# Patient Record
Sex: FEMALE | Race: WHITE
[De-identification: names, ages, dates, MRNs, and addresses within clinical notes are randomized per-mention and may not be internally consistent; named-entity substitution may affect disease eponyms.]

---

## 2020-05-02 ENCOUNTER — HOSPITAL ENCOUNTER (EMERGENCY)
Dept: HOSPITAL 46 - ED | Age: 70
Discharge: HOME | End: 2020-05-02
Payer: MEDICARE

## 2020-05-02 VITALS — HEIGHT: 69 IN | WEIGHT: 175 LBS | BODY MASS INDEX: 25.92 KG/M2

## 2020-05-02 DIAGNOSIS — J32.3: ICD-10-CM

## 2020-05-02 DIAGNOSIS — Z88.0: ICD-10-CM

## 2020-05-02 DIAGNOSIS — H53.9: Primary | ICD-10-CM

## 2020-05-02 DIAGNOSIS — I10: ICD-10-CM

## 2020-05-02 DIAGNOSIS — Z79.899: ICD-10-CM

## 2020-06-23 ENCOUNTER — HOSPITAL ENCOUNTER (OUTPATIENT)
Dept: HOSPITAL 46 - DS | Age: 70
Discharge: HOME | End: 2020-06-23
Attending: OTOLARYNGOLOGY
Payer: MEDICARE

## 2020-06-23 VITALS — WEIGHT: 170 LBS | BODY MASS INDEX: 25.18 KG/M2 | HEIGHT: 69 IN

## 2020-06-23 DIAGNOSIS — J32.3: ICD-10-CM

## 2020-06-23 DIAGNOSIS — E03.9: ICD-10-CM

## 2020-06-23 DIAGNOSIS — Z87.891: ICD-10-CM

## 2020-06-23 DIAGNOSIS — J33.8: Primary | ICD-10-CM

## 2020-06-23 DIAGNOSIS — Z79.899: ICD-10-CM

## 2020-06-23 DIAGNOSIS — F32.9: ICD-10-CM

## 2020-06-23 PROCEDURE — 09BX0ZZ EXCISION OF LEFT SPHENOID SINUS, OPEN APPROACH: ICD-10-PCS | Performed by: OTOLARYNGOLOGY

## 2020-06-23 NOTE — NUR
06/23/20 1101 Anamika Altamirano 1043 PT ARRIVED IN PACU NON RESPONSIVE TO NOXIOUS STIMULI WITH OPA
IN PLACE. 1053 PT REACTIVE. OPA REMOVED. 1100 DENTURES RETURNED TO PT.
AWAKE TALKING TO STAFF.

## 2020-06-23 NOTE — NUR
PATIENT TOLERATED WATER AND PUDDING.
VS CHECKED. DISCHARGE INSTRUCTIONS GIVEN TO PATIENT. IV DC'D WNL. TIP INTACT.
DRESSING APPLIED. ASSISTED PATIENT OOB AND TO WALK AROUND ROOM. GAIT STEADY.
PATIENT GETTING DRESSED.

## 2020-06-23 NOTE — OR
[facility]
                                    [address], [place]  [address]
_________________________________________________________________________________________
                                                                 Signed   
 
 
DATE OF OPERATION:
06/23/2020
 
SURGEON:
Adam Gonzalez MD
 
PREOPERATIVE DIAGNOSIS:
Chronic left sphenoid sinusitis.
 
POSTOPERATIVE DIAGNOSIS:
Chronic left sphenoid sinusitis.
 
PROCEDURE:
Left sphenoid sinusotomy.
 
ANESTHESIA:
General LMA.
 
CRNA:
Kwesi.
 
PREOPERATIVE HISTORY:
Ms. Puri is a 69-year-old lady, who has opacified left sphenoid sinus.  She was worked
up for visual problems.  CAT scan revealed this as an incidental finding.  She has some
calcifications in this sinus and she is taken to the operating for the above-mentioned
procedures. 
 
OPERATIVE PROCEDURE AND FINDINGS:
After informed consent, the patient was taken to the operating room, placed in supine
position where general LMA anesthesia was induced.  The patient and procedure were
verified.  The patient received preoperative intranasal oxymetazoline and intravenous
Ancef.  Preop CT was viewed throughout.  Headlight speculum exam of the nasal cavity
showed some polypoid changes on the left side posteriorly just at the anterior wall of
the sphenoid sinus.  Right side was clear.  The polypoid material was removed with the
Prosper and sent to pathology.  The sphenoid sinus ostium was located with a small
probe and suction passed through the ostium with purulence removed from the sinus.  The
anterior sphenoid sinus wall was widened with the Prosper.  Some mucosal thickening
fairly benign-appearing was removed with the Prosper from the sphenoid sinus and sent
to pathology as a separate specimen.  The sphenoid sinus ostium was adequately opened.
No other findings.  No other abnormalities.  Minimal bleeding, stopped afterwards,
pharynx was suctioned clear blood secretions.  The patient was then awakened, extubated,
transported to the recovery room in good condition.  No complications. 
 
    Electronically Signed By: ADAM GONZALEZ MD  06/23/20 1425
_________________________________________________________________________________________
PATIENT NAME:     MISTI PURI                      
MEDICAL RECORD #: [id number]            OPERATIVE REPORT              
          ACCT #: [id number]  
DATE OF BIRTH:   [birth year]            REPORT #: [id number]      
PHYSICIAN:        ADAM GONZALEZ MD              
PCP:              SAHIL JUNIOR PA-C           
REPORT IS CONFIDENTIAL AND NOT TO BE RELEASED WITHOUT AUTHORIZATION
 
 
                                  [address]  [address]
_________________________________________________________________________________________
                                                                 Signed   
 
 
 
BLOOD LOSS:
Minimal.
 
SPECIMEN:
To pathology.
 
DRAINS:
No drains.
 
PACKING:
No packing.
 
 
 
            ________________________________________
            Adam Gonzalez MD 
 
 
GC/MODL
Job #:  [id number]
DD:  06/23/2020 10:47:00
DT:  06/23/2020 11:49:09
 
 
Copies:                                
~
 
 
 
 
 
 
 
 
 
 
 
 
 
 
 
 
    Electronically Signed By: ADAM GONZALEZ MD  06/23/20 1425
_________________________________________________________________________________________
PATIENT NAME:     MISTI PURI                      
MEDICAL RECORD #: [id number]            OPERATIVE REPORT              
          ACCT #: [id number]  
DATE OF BIRTH:   [birth year]            REPORT #: [id number]      
PHYSICIAN:        ADAM GONZALEZ MD              
PCP:              SAHIL JUNIOR PA-C           
REPORT IS CONFIDENTIAL AND NOT TO BE RELEASED WITHOUT AUTHORIZATION

## 2020-06-23 NOTE — NUR
PATIENT BACK IN DAY SURGERY ROOM FROM PACU. RATES PAIN IN NOSE AREA 3/10.
DENIES NEED FOR PAIN MEDICATION AT THIS TIME. MOUSTACHE DRESSING IN PLACE IS
CLEAN, DRY AND INTACT. VS CHECKED. IV SITE WNL. CALL LIGHT WITHIN REACH. ICE
WATER PLACED AT BEDSIDE. PUDDING GIVEN TO PATIENT.